# Patient Record
(demographics unavailable — no encounter records)

---

## 2025-04-28 NOTE — HISTORY OF PRESENT ILLNESS
[de-identified] : Marla returns in for follow-up.  She is about 18 months status post left microdiscectomy for sciatica.  She had recurrence since happened about 6 months ago.  She describes it as more of a restless feeling in her leg.  She does have some numbness she does have some pain is not on a daily basis it is intermittent.  The restlessness tends to bother her at nighttime.  She is walking for exercise.  She is losing weight.  She has no other new complaints.

## 2025-04-28 NOTE — PHYSICAL EXAM
[de-identified] : On exam the incision is healed range of motion is about 75% of normal straight leg raising aggravates her pain on the left is negative on the right.  Her strength is 5 out of 5 reflexes within normal limits light touch sensations intact [de-identified] : Four lumbar x-rays taken today demonstrates thoracolumbar curve T11-L3 52 degrees fractional curve below L4-S1 is 23 degrees is concave to the right there is no instability on flexion-extension.  She has got asymmetrical disc collapse at the L5-S1 segment.

## 2025-04-28 NOTE — ASSESSMENT
[FreeTextEntry1] : I had a lengthy talk with Marla today in the office.  I like to try her on gabapentin at nighttime.  I think this may alleviate her nerve symptoms.  In addition to that she can try taking some Flexeril once or twice a day.  Of asked her to let me know how she feels after the next week or 2 of taking the gabapentin.  If she has persistent issues we will get a new MRI scan.  She is comfortable this plan.  All of her questions were addressed.